# Patient Record
Sex: MALE | Race: WHITE | NOT HISPANIC OR LATINO | Employment: FULL TIME | URBAN - METROPOLITAN AREA
[De-identification: names, ages, dates, MRNs, and addresses within clinical notes are randomized per-mention and may not be internally consistent; named-entity substitution may affect disease eponyms.]

---

## 2017-10-07 ENCOUNTER — ALLIED HEALTH/NURSE VISIT (OUTPATIENT)
Dept: NURSING | Facility: CLINIC | Age: 32
End: 2017-10-07
Payer: COMMERCIAL

## 2017-10-07 DIAGNOSIS — Z23 NEED FOR PROPHYLACTIC VACCINATION AND INOCULATION AGAINST INFLUENZA: Primary | ICD-10-CM

## 2017-10-07 PROCEDURE — 90686 IIV4 VACC NO PRSV 0.5 ML IM: CPT

## 2017-10-07 PROCEDURE — 99207 ZZC NO CHARGE NURSE ONLY: CPT

## 2017-10-07 PROCEDURE — 90471 IMMUNIZATION ADMIN: CPT

## 2017-10-07 NOTE — PROGRESS NOTES
Injectable Influenza Immunization Documentation    1.  Is the person to be vaccinated sick today?   No    2. Does the person to be vaccinated have an allergy to a component   of the vaccine?   No    3. Has the person to be vaccinated ever had a serious reaction   to influenza vaccine in the past?   No    4. Has the person to be vaccinated ever had Guillain-Barré syndrome?   No    Form completed by Florinda Arrington/[facility]

## 2017-10-07 NOTE — MR AVS SNAPSHOT
"              After Visit Summary   10/7/2017    Ivan Goode    MRN: [id number]           Patient Information     Date Of Birth          1985        Visit Information        Provider Department      10/7/2017 9:30 AM PRAFUL EUGENE MA/[facility]        Today's Diagnoses     Need for prophylactic vaccination and inoculation against influenza    -  1       Follow-ups after your visit        Who to contact     If you have questions or need follow up information about today's clinic visit or your schedule please contact [facility] directly at [phone].  Normal or non-critical lab and imaging results will be communicated to you by InTuun Systemshart, letter or phone within 4 business days after the clinic has received the results. If you do not hear from us within 7 days, please contact the clinic through Newzstandt or phone. If you have a critical or abnormal lab result, we will notify you by phone as soon as possible.  Submit refill requests through Allclasses or call your pharmacy and they will forward the refill request to us. Please allow 3 business days for your refill to be completed.          Additional Information About Your Visit        MyChart Information     Allclasses lets you send messages to your doctor, view your test results, renew your prescriptions, schedule appointments and more. To sign up, go to [email or web address] . Click on \"Log in\" on the left side of the screen, which will take you to the Welcome page. Then click on \"Sign up Now\" on the right side of the page.     You will be asked to enter the access code listed below, as well as some personal information. Please follow the directions to create your username and password.     Your access code is: [email or web address]  Expires: 2018 10:35 AM     Your access code will  in 90 days. If you need help or a new code, please call your [facility] or [phone].        Care EveryWhere ID     This is your " Care EveryWhere ID. This could be used by other organizations to access your [place] medical records  [id number]         Blood Pressure from Last 3 Encounters:   04/05/16 126/74   03/21/16 130/70   12/17/15 112/60    Weight from Last 3 Encounters:   04/05/16 211 lb (95.7 kg)   03/21/16 209 lb (94.8 kg)   12/17/15 206 lb (93.4 kg)              We Performed the Following     FLU VAC, SPLIT VIRUS IM > 3 YO (QUADRIVALENT) [66254]     Vaccine Administration, Initial [48634]        Primary Care Provider Office Phone # Fax #    Tra Stefano Crocker PA-C [phone]       [address] [facility] [address]        Equal Access to Services     RUSTY BENITO : Hadii rahul ortega Sojuan pablo, waaxda luqadaha, qaybta kaalmada adeegyaaraseli, gokul house . So [facility] [phone].    ATENCIÓN: Si habla español, tiene a mccullough disposición servicios gratuitos de asistencia lingüística. Llame al [phone].    We comply with applicable federal civil rights laws and [place] laws. We do not discriminate on the basis of race, color, national origin, age, disability, sex, sexual orientation, or gender identity.            Thank you!     Thank you for choosing [facility]  for your care. Our goal is always to provide you with excellent care. Hearing back from our patients is one way we can continue to improve our services. Please take a few minutes to complete the written survey that you may receive in the mail after your visit with us. Thank you!             Your Updated Medication List - Protect others around you: Learn how to safely use, store and throw away your medicines at www.disposemymeds.org.      Notice  As of 10/7/2017 10:35 AM    You have not been prescribed any medications.

## 2018-03-13 ENCOUNTER — OFFICE VISIT (OUTPATIENT)
Dept: OPTOMETRY | Facility: CLINIC | Age: 33
End: 2018-03-13
Payer: COMMERCIAL

## 2018-03-13 ENCOUNTER — TELEPHONE (OUTPATIENT)
Dept: OPTOMETRY | Facility: CLINIC | Age: 33
End: 2018-03-13

## 2018-03-13 DIAGNOSIS — H52.13 MYOPIA, BILATERAL: Primary | ICD-10-CM

## 2018-03-13 PROCEDURE — 92015 DETERMINE REFRACTIVE STATE: CPT | Performed by: OPTOMETRIST

## 2018-03-13 PROCEDURE — 92004 COMPRE OPH EXAM NEW PT 1/>: CPT | Performed by: OPTOMETRIST

## 2018-03-13 PROCEDURE — 92310 CONTACT LENS FITTING OU: CPT | Performed by: OPTOMETRIST

## 2018-03-13 ASSESSMENT — REFRACTION_WEARINGRX
OD_CYLINDER: SPHERE
OS_AXIS: 070
OD_SPHERE: -5.75
OS_SPHERE: -5.75
SPECS_TYPE: SVL
OS_CYLINDER: +0.25

## 2018-03-13 ASSESSMENT — KERATOMETRY
OD_K1POWER_DIOPTERS: 44.00
OS_AXISANGLE_DEGREES: 37
OD_AXISANGLE2_DEGREES: 38
METHOD_AUTO_MANUAL: MANUAL
OS_K2POWER_DIOPTERS: 45.25
OD_K2POWER_DIOPTERS: 43.50
OS_AXISANGLE2_DEGREES: 127
OD_AXISANGLE_DEGREES: 128
OS_K1POWER_DIOPTERS: 45.12

## 2018-03-13 ASSESSMENT — REFRACTION_CURRENTRX
OD_SPHERE: -4.50
OD_BRAND: BOSTON EO
OS_BRAND: BOSTON EO
OS_DIAMETER: 9.2
OS_SPHERE: -4.75
OS_BASECURVE: 44.50
OD_DIAMETER: 9.2
OD_BASECURVE: 44.00

## 2018-03-13 ASSESSMENT — VISUAL ACUITY
OD_SC: 20/500
OS_CC: 20/30
METHOD: SNELLEN - LINEAR
OS_CC: 20/30
OD_CC: 20/30
CORRECTION_TYPE: GLASSES
OD_CC+: -2
OD_CC: 20/30
OS_CC+: -1
OS_SC: 20/300

## 2018-03-13 ASSESSMENT — SLIT LAMP EXAM - LIDS
COMMENTS: NORMAL
COMMENTS: NORMAL

## 2018-03-13 ASSESSMENT — CUP TO DISC RATIO
OD_RATIO: 0.15
OS_RATIO: 0.15

## 2018-03-13 ASSESSMENT — REFRACTION_MANIFEST
OS_SPHERE: -6.25
OD_AXIS: 150
METHOD_AUTOREFRACTION: 1
OD_CYLINDER: +0.75
OS_CYLINDER: +1.00
OS_SPHERE: -6.50
OD_SPHERE: -5.50
OD_CYLINDER: +0.75
OS_CYLINDER: +0.75
OS_AXIS: 025
OD_AXIS: 160
OS_AXIS: 020
OD_SPHERE: -5.50

## 2018-03-13 ASSESSMENT — TONOMETRY
OD_IOP_MMHG: 16
OS_IOP_MMHG: 16
IOP_METHOD: APPLANATION

## 2018-03-13 ASSESSMENT — CONF VISUAL FIELD
OS_NORMAL: 1
OD_NORMAL: 1

## 2018-03-13 ASSESSMENT — EXTERNAL EXAM - RIGHT EYE: OD_EXAM: NORMAL

## 2018-03-13 ASSESSMENT — EXTERNAL EXAM - LEFT EYE: OS_EXAM: NORMAL

## 2018-03-13 NOTE — PROGRESS NOTES
Chief Complaint   Patient presents with     COMPREHENSIVE EYE EXAM     Gas Perm Contact     Contact Lens Evaluation     Had a lasik consult last year and discontinue wear for 3 mos he was seen at [place] for another PRK and was not a candidate   Some inferior thinning   Vision was stable     Previous contact lens wearer? Yes: Gas Perm  Comfort of contact lenses :Good  Satisfied with current lenses: Yes, Open to other options for lenses        Last Eye Exam: 10yrs  Dilated Previously: Yes    What are you currently using to see?  contacts    Distance Vision Acuity: Satisfied with vision    Near Vision Acuity: Satisfied with vision while reading  unaided    Eye Comfort: good  Do you use eye drops? : No  Occupation or Hobbies: Everyday stuff  Works at [employer]         Medical, surgical and family histories reviewed and updated 3/13/2018.       OBJECTIVE: See Ophthalmology exam    ASSESSMENT:    ICD-10-CM    1. Myopia, bilateral H52.13 EYE EXAM (SIMPLE-NONBILLABLE)     REFRACTION     CONTACT LENS FITTING,BILAT          PLAN:   Replace glasses and contacts   Ordered gas perms in [facility] as indicated   Return to clinic for dispense    Siri Dial OD

## 2018-03-13 NOTE — TELEPHONE ENCOUNTER
PLAN:   Replace glasses and contacts   Ordered gas perms in [place] [facility] as indicated   Return to clinic for dispense     Siri Dial OD    Current Contact Lens Rx (Ordered)     Brand Base Curve Diameter Sphere Lens Addl. Specs   Right [place] [facility] 44.00 9.2 -4.50 dot blue lenticular, warranted    Left [place] [facility] 44.50 9.2 -4.75 dot         Addy Rose  Optometric Technician

## 2018-03-13 NOTE — MR AVS SNAPSHOT
"              After Visit Summary   3/13/2018    Ivan Goode    MRN: [id number]           Patient Information     Date Of Birth          1985        Visit Information        Provider Department      3/13/2018 2:00 PM Siri Dial OD [facility]        Today's Diagnoses     Myopia, bilateral    -  1       Follow-ups after your visit        Follow-up notes from your care team     Return in about 1 year (around 3/13/2019).      Who to contact     If you have questions or need follow up information about today's clinic visit or your schedule please contact [facility] directly at [phone].  Normal or non-critical lab and imaging results will be communicated to you by MyChart, letter or phone within 4 business days after the clinic has received the results. If you do not hear from us within 7 days, please contact the clinic through [email or web address] or phone. If you have a critical or abnormal lab result, we will notify you by phone as soon as possible.  Submit refill requests through InspireMD or call your pharmacy and they will forward the refill request to us. Please allow 3 business days for your refill to be completed.          Additional Information About Your Visit        MyChart Information     InspireMD lets you send messages to your doctor, view your test results, renew your prescriptions, schedule appointments and more. To sign up, go to [email or web address] . Click on \"Log in\" on the left side of the screen, which will take you to the Welcome page. Then click on \"Sign up Now\" on the right side of the page.     You will be asked to enter the access code listed below, as well as some personal information. Please follow the directions to create your username and password.     Your access code is: [email or web address]  Expires: 2018  3:04 PM     Your access code will  in 90 days. If you need help or a new code, please call your [place] clinic or [phone].      "   Care EveryWhere ID     This is your Care EveryWhere ID. This could be used by other organizations to access your [place] medical records  [id number]         Blood Pressure from Last 3 Encounters:   04/05/16 126/74   03/21/16 130/70   12/17/15 112/60    Weight from Last 3 Encounters:   04/05/16 95.7 kg (211 lb)   03/21/16 94.8 kg (209 lb)   12/17/15 93.4 kg (206 lb)              We Performed the Following     CONTACT LENS FITTING,BILAT     EYE EXAM (SIMPLE-NONBILLABLE)     REFRACTION        Primary Care Provider Office Phone # Fax #    Tra Stefano Crocker PA-C [phone] [address] [facility] [address]        Equal Access to Services     RUSTY BENITO : Hadii rahul juareso Sojuan pablo, waaxda luqadaha, qaybta kaalmada adeegyada, gokul drake. So [facility] [phone].    ATENCIÓN: Si habla español, tiene a mccullough disposición servicios gratuitos de asistencia lingüística. Llame al [phone].    We comply with applicable federal civil rights laws and [place] laws. We do not discriminate on the basis of race, color, national origin, age, disability, sex, sexual orientation, or gender identity.            Thank you!     Thank you for choosing [facility]  for your care. Our goal is always to provide you with excellent care. Hearing back from our patients is one way we can continue to improve our services. Please take a few minutes to complete the written survey that you may receive in the mail after your visit with us. Thank you!             Your Updated Medication List - Protect others around you: Learn how to safely use, store and throw away your medicines at www.disposemymeds.org.      Notice  As of 3/13/2018  3:04 PM    You have not been prescribed any medications.

## 2018-03-13 NOTE — LETTER
3/13/2018         RE: Ivan Goode  [address]  [place] [address]        Dear Colleague,    Thank you for referring your patient, Ivan Goode, to the [facility]. Please see a copy of my visit note below.    Chief Complaint   Patient presents with     COMPREHENSIVE EYE EXAM     Gas Perm Contact     Contact Lens Evaluation     Had a lasik consult last year and discontinue wear for 3 mos he was seen at [place] for another PRK and was not a candidate   Some inferior thinning   Vision was stable     Previous contact lens wearer? Yes: Gas Perm  Comfort of contact lenses :Good  Satisfied with current lenses: Yes, Open to other options for lenses        Last Eye Exam: 10yrs  Dilated Previously: Yes    What are you currently using to see?  contacts    Distance Vision Acuity: Satisfied with vision    Near Vision Acuity: Satisfied with vision while reading  unaided    Eye Comfort: good  Do you use eye drops? : No  Occupation or Hobbies: Everyday stuff  Works at [employer]         Medical, surgical and family histories reviewed and updated 3/13/2018.       OBJECTIVE: See Ophthalmology exam    ASSESSMENT:    ICD-10-CM    1. Myopia, bilateral H52.13 EYE EXAM (SIMPLE-NONBILLABLE)     REFRACTION     CONTACT LENS FITTING,BILAT          PLAN:   Replace glasses and contacts   Ordered gas perms in [facility] as indicated   Return to clinic for dispense    Siri Dial OD                     Again, thank you for allowing me to participate in the care of your patient.        Sincerely,        Siri Dial, OD

## 2018-03-20 ENCOUNTER — OFFICE VISIT (OUTPATIENT)
Dept: OPTOMETRY | Facility: CLINIC | Age: 33
End: 2018-03-20
Payer: COMMERCIAL

## 2018-03-20 DIAGNOSIS — H52.13 MYOPIA, BILATERAL: Primary | ICD-10-CM

## 2018-03-20 PROCEDURE — 99207 ZZC NO BILLABLE SERVICE THIS VISIT: CPT | Performed by: OPTOMETRIST

## 2018-03-20 PROCEDURE — 92499 UNLISTED OPH SVC/PROCEDURE: CPT | Performed by: OPTOMETRIST

## 2018-03-20 ASSESSMENT — REFRACTION_CURRENTRX
OD_BASECURVE: 44.00
OS_SPHERE: -4.75
OD_DIAMETER: 9.2
OS_BRAND: BOSTON EO
OD_SPHERE: -4.50
OD_BASECURVE: 44.50
OD_BRAND: BOSTON EO
OS_DIAMETER: 9.2
OS_BRAND: BOSTON EO
OD_DIAMETER: 9.2
OS_BASECURVE: 44.50
OS_SPHERE: -5.25
OD_SPHERE: -5.00
OD_BRAND: BOSTON EO
OS_BASECURVE: 45.00
OS_DIAMETER: 9.2

## 2018-03-20 ASSESSMENT — VISUAL ACUITY
METHOD: SNELLEN - LINEAR
CORRECTION_TYPE: CONTACTS

## 2018-03-20 ASSESSMENT — EXTERNAL EXAM - LEFT EYE: OS_EXAM: NORMAL

## 2018-03-20 ASSESSMENT — SLIT LAMP EXAM - LIDS
COMMENTS: FOAMY TEARS
COMMENTS: FOAMY TEARS

## 2018-03-20 ASSESSMENT — EXTERNAL EXAM - RIGHT EYE: OD_EXAM: NORMAL

## 2018-03-20 NOTE — Clinical Note
Order another pair under warranty 0.5D steeper with adjusted ocampo. Return to clinic for dispense. thx

## 2018-03-20 NOTE — MR AVS SNAPSHOT
"              After Visit Summary   3/20/2018    Ivan Goode    MRN: [id number]           Patient Information     Date Of Birth          1985        Visit Information        Provider Department      3/20/2018 9:20 AM Siri Dial OD [facility]        Today's Diagnoses     Myopia, bilateral    -  1       Follow-ups after your visit        Who to contact     If you have questions or need follow up information about today's clinic visit or your schedule please contact [facility] directly at [phone].  Normal or non-critical lab and imaging results will be communicated to you by MyChart, letter or phone within 4 business days after the clinic has received the results. If you do not hear from us within 7 days, please contact the clinic through [place] 1000CHI Software Technologyhart or phone. If you have a critical or abnormal lab result, we will notify you by phone as soon as possible.  Submit refill requests through Fandeavor or call your pharmacy and they will forward the refill request to us. Please allow 3 business days for your refill to be completed.          Additional Information About Your Visit        MyChart Information     Fandeavor lets you send messages to your doctor, view your test results, renew your prescriptions, schedule appointments and more. To sign up, go to [email or web address] . Click on \"Log in\" on the left side of the screen, which will take you to the Welcome page. Then click on \"Sign up Now\" on the right side of the page.     You will be asked to enter the access code listed below, as well as some personal information. Please follow the directions to create your username and password.     Your access code is: [email or web address]  Expires: 2018  3:04 PM     Your access code will  in 90 days. If you need help or a new code, please call your [facility] or [phone].        Care EveryWhere ID     This is your Care EveryWhere ID. This could be used by other " organizations to access your [place] medical records  [id number]         Blood Pressure from Last 3 Encounters:   04/05/16 126/74   03/21/16 130/70   12/17/15 112/60    Weight from Last 3 Encounters:   04/05/16 95.7 kg (211 lb)   03/21/16 94.8 kg (209 lb)   12/17/15 93.4 kg (206 lb)              Today, you had the following     No orders found for display       Primary Care Provider Office Phone # Fax #    Tra Stefano Crocker PA-C [phone] [address] [address] [facility] [address]        Equal Access to Services     SINDHU BENITO : Hadii rahul Esposito, waaxda luguanakoadaha, qaybta kaalmada schuyler, gokul house . So [facility] [phone].    ATENCIÓN: Si habla español, tiene a mccullough disposición servicios gratuitos de asistencia lingüística. Llame al [phone].    We comply with applicable federal civil rights laws and [place] laws. We do not discriminate on the basis of race, color, national origin, age, disability, sex, sexual orientation, or gender identity.            Thank you!     Thank you for choosing [facility]  for your care. Our goal is always to provide you with excellent care. Hearing back from our patients is one way we can continue to improve our services. Please take a few minutes to complete the written survey that you may receive in the mail after your visit with us. Thank you!             Your Updated Medication List - Protect others around you: Learn how to safely use, store and throw away your medicines at www.disposemymeds.org.      Notice  As of 3/20/2018  1:02 PM    You have not been prescribed any medications.

## 2018-03-20 NOTE — LETTER
3/20/2018         RE: vIan Goode  [address]  [place] [address]        Dear Colleague,    Thank you for referring your patient, Ivan Goode, to the [facility]. Please see a copy of my visit note below.    Chief Complaint   Patient presents with     Contact Lens Check     Dispense and check     [place] EO feel good  Contact lenses dispensed          OBJECTIVE: See Ophthalmology exam     ASSESSMENT:    ICD-10-CM    1. Myopia, bilateral H52.13     lenses are too flat  PLAN:  Warm compresses lid hygiene, ARTIFICIAL TEARS  OD  Dispensed   Order 0.5D steeper with minus adjustment  Exchange at dispense    Siri Dial OD           Again, thank you for allowing me to participate in the care of your patient.        Sincerely,        Siri Dial, OD

## 2018-03-20 NOTE — PROGRESS NOTES
Chief Complaint   Patient presents with     Contact Lens Check     Dispense and check     [place] EO feel good  Contact lenses dispensed          OBJECTIVE: See Ophthalmology exam     ASSESSMENT:    ICD-10-CM    1. Myopia, bilateral H52.13     lenses are too flat  PLAN:  Warm compresses lid hygiene, ARTIFICIAL TEARS  OD  Dispensed   Order 0.5D steeper with minus adjustment  Exchange at dispense    Siri Dial OD

## 2018-07-17 ENCOUNTER — OFFICE VISIT (OUTPATIENT)
Dept: OPTOMETRY | Facility: CLINIC | Age: 33
End: 2018-07-17
Payer: COMMERCIAL

## 2018-07-17 DIAGNOSIS — H52.13 MYOPIA OF BOTH EYES: Primary | ICD-10-CM

## 2018-07-17 PROCEDURE — 99207 ZZC NO BILLABLE SERVICE THIS VISIT: CPT | Performed by: OPTOMETRIST

## 2018-07-17 ASSESSMENT — REFRACTION_CURRENTRX
OD_BASECURVE: 44.50
OD_DIAMETER: 9.2
OS_BASECURVE: 45.00
OD_BRAND: BOSTON EO
OS_SPHERE: -5.25
OD_SPHERE: -5.00
OS_DIAMETER: 9.2
OS_BRAND: BOSTON EO

## 2018-07-17 NOTE — MR AVS SNAPSHOT
After Visit Summary   7/17/2018    Ivan Goode    MRN: [id number]           Patient Information     Date Of Birth          1985        Visit Information        Provider Department      7/17/2018 10:20 AM Siri Dial OD [facility]        Today's Diagnoses     Myopia of both eyes    -  1       Follow-ups after your visit        Follow-up notes from your care team     Return if symptoms worsen or fail to improve.      Who to contact     If you have questions or need follow up information about today's clinic visit or your schedule please contact [facility] directly at [phone].  Normal or non-critical lab and imaging results will be communicated to you by MyChart, letter or phone within 4 business days after the clinic has received the results. If you do not hear from us within 7 days, please contact the clinic through MyChart or phone. If you have a critical or abnormal lab result, we will notify you by phone as soon as possible.  Submit refill requests through adjust or call your pharmacy and they will forward the refill request to us. Please allow 3 business days for your refill to be completed.          Additional Information About Your Visit        Care EveryWhere ID     This is your Care EveryWhere ID. This could be used by other organizations to access your [place] medical records  [id number]         Blood Pressure from Last 3 Encounters:   04/05/16 126/74   03/21/16 130/70   12/17/15 112/60    Weight from Last 3 Encounters:   04/05/16 95.7 kg (211 lb)   03/21/16 94.8 kg (209 lb)   12/17/15 93.4 kg (206 lb)              Today, you had the following     No orders found for display       Primary Care Provider Office Phone # Fax #    Tra Stefano Crocker PA-C [phone] [address] [facility] [address]        Equal Access to Services     RUSTY BENITO : Ellyn Esposito, chris cavanaugh, genaro hewitt  gokul payanshawnglory la'aasina ah. [facility] [phone].    ATENCIÓN: Si habla español, tiene a mccullough disposición servicios gratuitos de asistencia lingüística. Arminda al [phone].    We comply with applicable federal civil rights laws and [place] laws. We do not discriminate on the basis of race, color, national origin, age, disability, sex, sexual orientation, or gender identity.            Thank you!     Thank you for choosing [facility]  for your care. Our goal is always to provide you with excellent care. Hearing back from our patients is one way we can continue to improve our services. Please take a few minutes to complete the written survey that you may receive in the mail after your visit with us. Thank you!             Your Updated Medication List - Protect others around you: Learn how to safely use, store and throw away your medicines at www.disposemymeds.org.      Notice  As of 7/17/2018 12:02 PM    You have not been prescribed any medications.

## 2018-07-17 NOTE — PROGRESS NOTES
Contact lens follow up  With complaints of/ no complaints, needs contact lens  Prescription finalized   Change in fit was picked up and never return to clinic for follow up   Good comfort, vision, no problems       Assessment:  Good fit and vision and comfort with the above contact lenses  Lenses wet well    Plan:   A contact lens  prescription was released     Siri Dial OD

## 2018-07-17 NOTE — LETTER
7/17/2018         RE: Ivan Goode  [address]  [place] [address]        Dear Colleague,    Thank you for referring your patient, Ivan Goode, to the [facility]. Please see a copy of my visit note below.    Contact lens follow up  With complaints of/ no complaints, needs contact lens  Prescription finalized   Change in fit was picked up and never return to clinic for follow up   Good comfort, vision, no problems       Assessment:  Good fit and vision and comfort with the above contact lenses  Lenses wet well    Plan:   A contact lens  prescription was released     Siri Dial OD     Again, thank you for allowing me to participate in the care of your patient.        Sincerely,        Siri Dial, OD

## 2018-10-11 ENCOUNTER — ALLIED HEALTH/NURSE VISIT (OUTPATIENT)
Dept: NURSING | Facility: CLINIC | Age: 33
End: 2018-10-11
Payer: COMMERCIAL

## 2018-10-11 DIAGNOSIS — Z23 NEED FOR PROPHYLACTIC VACCINATION AND INOCULATION AGAINST INFLUENZA: Primary | ICD-10-CM

## 2018-10-11 PROCEDURE — 90471 IMMUNIZATION ADMIN: CPT

## 2018-10-11 PROCEDURE — 90686 IIV4 VACC NO PRSV 0.5 ML IM: CPT

## 2018-10-11 NOTE — PROGRESS NOTES

## 2018-10-11 NOTE — MR AVS SNAPSHOT
After Visit Summary   10/11/2018    Ivan Goode    MRN: [id number]           Patient Information     Date Of Birth          1985        Visit Information        Provider Department      10/11/2018 3:00 PM PRAFUL LEMUS/[facility]        Today's Diagnoses     Need for prophylactic vaccination and inoculation against influenza    -  1       Follow-ups after your visit        Who to contact     If you have questions or need follow up information about today's clinic visit or your schedule please contact [facility] directly at [phone].  Normal or non-critical lab and imaging results will be communicated to you by MyChart, letter or phone within 4 business days after the clinic has received the results. If you do not hear from us within 7 days, please contact the clinic through MyChart or phone. If you have a critical or abnormal lab result, we will notify you by phone as soon as possible.  Submit refill requests through NetScaler or call your pharmacy and they will forward the refill request to us. Please allow 3 business days for your refill to be completed.          Additional Information About Your Visit        Care EveryWhere ID     This is your Care EveryWhere ID. This could be used by other organizations to access your [place] medical records  [id number]         Blood Pressure from Last 3 Encounters:   04/05/16 126/74   03/21/16 130/70   12/17/15 112/60    Weight from Last 3 Encounters:   04/05/16 211 lb (95.7 kg)   03/21/16 209 lb (94.8 kg)   12/17/15 206 lb (93.4 kg)              We Performed the Following     FLU VACCINE, SPLIT VIRUS, IM (QUADRIVALENT) [97808]- >3 YRS     Vaccine Administration, Initial [15883]        Primary Care Provider Office Phone # Fax #    Tra Stefano Crocker PA-C [phone] [address] [facility] [address]        Equal Access to Services     RUSTY BENITO : Ellyn Esposito,  chris cavanaugh, kiaraenio restrepoclay pasqualehowie, gokul naziain hayaan fredymelvin redjustyna laYonnytc ah. So [facility] [phone].    ATENCIÓN: Si habla español, tiene a mccullough disposición servicios gratuitos de asistencia lingüística. Llame al [phone].    We comply with applicable federal civil rights laws and [place] laws. We do not discriminate on the basis of race, color, national origin, age, disability, sex, sexual orientation, or gender identity.            Thank you!     Thank you for choosing [facility]  for your care. Our goal is always to provide you with excellent care. Hearing back from our patients is one way we can continue to improve our services. Please take a few minutes to complete the written survey that you may receive in the mail after your visit with us. Thank you!             Your Updated Medication List - Protect others around you: Learn how to safely use, store and throw away your medicines at www.disposemymeds.org.      Notice  As of 10/11/2018  3:42 PM    You have not been prescribed any medications.

## 2019-10-01 ENCOUNTER — ALLIED HEALTH/NURSE VISIT (OUTPATIENT)
Dept: NURSING | Facility: CLINIC | Age: 34
End: 2019-10-01
Payer: COMMERCIAL

## 2019-10-01 DIAGNOSIS — Z23 NEED FOR PROPHYLACTIC VACCINATION AND INOCULATION AGAINST INFLUENZA: Primary | ICD-10-CM

## 2019-10-01 PROCEDURE — 90686 IIV4 VACC NO PRSV 0.5 ML IM: CPT

## 2019-10-01 PROCEDURE — 90471 IMMUNIZATION ADMIN: CPT

## 2019-10-01 PROCEDURE — 99207 ZZC NO CHARGE NURSE ONLY: CPT

## 2020-10-08 ENCOUNTER — ALLIED HEALTH/NURSE VISIT (OUTPATIENT)
Dept: FAMILY MEDICINE | Facility: CLINIC | Age: 35
End: 2020-10-08
Payer: COMMERCIAL

## 2020-10-08 DIAGNOSIS — Z23 NEED FOR PROPHYLACTIC VACCINATION AND INOCULATION AGAINST INFLUENZA: Primary | ICD-10-CM

## 2020-10-08 PROCEDURE — 90471 IMMUNIZATION ADMIN: CPT

## 2020-10-08 PROCEDURE — 99207 PR NO CHARGE NURSE ONLY: CPT

## 2020-10-08 PROCEDURE — 90686 IIV4 VACC NO PRSV 0.5 ML IM: CPT

## 2021-04-07 ENCOUNTER — IMMUNIZATION (OUTPATIENT)
Dept: NURSING | Facility: CLINIC | Age: 36
End: 2021-04-07
Payer: COMMERCIAL

## 2021-04-07 PROCEDURE — 91300 PR COVID VAC PFIZER DIL RECON 30 MCG/0.3 ML IM: CPT

## 2021-04-07 PROCEDURE — 0001A PR COVID VAC PFIZER DIL RECON 30 MCG/0.3 ML IM: CPT

## 2021-04-28 ENCOUNTER — IMMUNIZATION (OUTPATIENT)
Dept: NURSING | Facility: CLINIC | Age: 36
End: 2021-04-28
Attending: INTERNAL MEDICINE
Payer: COMMERCIAL

## 2021-04-28 PROCEDURE — 91300 PR COVID VAC PFIZER DIL RECON 30 MCG/0.3 ML IM: CPT

## 2021-04-28 PROCEDURE — 0002A PR COVID VAC PFIZER DIL RECON 30 MCG/0.3 ML IM: CPT

## 2021-10-02 ENCOUNTER — IMMUNIZATION (OUTPATIENT)
Dept: FAMILY MEDICINE | Facility: CLINIC | Age: 36
End: 2021-10-02
Payer: COMMERCIAL

## 2021-10-02 PROCEDURE — 90471 IMMUNIZATION ADMIN: CPT

## 2021-10-02 PROCEDURE — 90686 IIV4 VACC NO PRSV 0.5 ML IM: CPT

## 2021-11-09 ENCOUNTER — ALLIED HEALTH/NURSE VISIT (OUTPATIENT)
Dept: FAMILY MEDICINE | Facility: CLINIC | Age: 36
End: 2021-11-09
Payer: COMMERCIAL

## 2021-11-09 DIAGNOSIS — Z23 NEED FOR COVID-19 VACCINE: Primary | ICD-10-CM

## 2021-11-09 PROCEDURE — 91300 PR COVID VAC PFIZER DIL RECON 30 MCG/0.3 ML IM: CPT

## 2021-11-09 PROCEDURE — 0004A PR COVID VAC PFIZER DIL RECON 30 MCG/0.3 ML IM: CPT

## 2021-11-09 PROCEDURE — 99207 PR NO CHARGE NURSE ONLY: CPT

## 2021-11-21 ENCOUNTER — HEALTH MAINTENANCE LETTER (OUTPATIENT)
Age: 36
End: 2021-11-21

## 2021-11-30 ENCOUNTER — OFFICE VISIT (OUTPATIENT)
Dept: FAMILY MEDICINE | Facility: CLINIC | Age: 36
End: 2021-11-30
Payer: COMMERCIAL

## 2021-11-30 VITALS
HEIGHT: 70 IN | SYSTOLIC BLOOD PRESSURE: 128 MMHG | BODY MASS INDEX: 34.07 KG/M2 | WEIGHT: 238 LBS | TEMPERATURE: 98.1 F | DIASTOLIC BLOOD PRESSURE: 72 MMHG | HEART RATE: 66 BPM | OXYGEN SATURATION: 97 %

## 2021-11-30 DIAGNOSIS — G56.03 BILATERAL CARPAL TUNNEL SYNDROME: ICD-10-CM

## 2021-11-30 DIAGNOSIS — Z00.00 ROUTINE GENERAL MEDICAL EXAMINATION AT A HEALTH CARE FACILITY: Primary | ICD-10-CM

## 2021-11-30 DIAGNOSIS — L30.9 DERMATITIS: ICD-10-CM

## 2021-11-30 DIAGNOSIS — Z13.220 SCREENING FOR HYPERLIPIDEMIA: ICD-10-CM

## 2021-11-30 DIAGNOSIS — Z11.59 NEED FOR HEPATITIS C SCREENING TEST: ICD-10-CM

## 2021-11-30 LAB — HCV AB SERPL QL IA: NONREACTIVE

## 2021-11-30 PROCEDURE — 36415 COLL VENOUS BLD VENIPUNCTURE: CPT | Performed by: PHYSICIAN ASSISTANT

## 2021-11-30 PROCEDURE — 86803 HEPATITIS C AB TEST: CPT | Performed by: PHYSICIAN ASSISTANT

## 2021-11-30 PROCEDURE — 99213 OFFICE O/P EST LOW 20 MIN: CPT | Mod: 25 | Performed by: PHYSICIAN ASSISTANT

## 2021-11-30 PROCEDURE — 80061 LIPID PANEL: CPT | Performed by: PHYSICIAN ASSISTANT

## 2021-11-30 PROCEDURE — 99395 PREV VISIT EST AGE 18-39: CPT | Performed by: PHYSICIAN ASSISTANT

## 2021-11-30 PROCEDURE — 82947 ASSAY GLUCOSE BLOOD QUANT: CPT | Performed by: PHYSICIAN ASSISTANT

## 2021-11-30 RX ORDER — BENZOCAINE/MENTHOL 6 MG-10 MG
LOZENGE MUCOUS MEMBRANE 2 TIMES DAILY
Qty: 45 G | Refills: 0 | Status: SHIPPED | OUTPATIENT
Start: 2021-11-30

## 2021-11-30 ASSESSMENT — ENCOUNTER SYMPTOMS
HEADACHES: 0
MYALGIAS: 0
NERVOUS/ANXIOUS: 0
CHILLS: 0
ABDOMINAL PAIN: 0
NAUSEA: 0
DIARRHEA: 0
DIZZINESS: 0
COUGH: 0
FEVER: 0
CONSTIPATION: 0
JOINT SWELLING: 0
DYSURIA: 0
HEARTBURN: 0
SORE THROAT: 0
SHORTNESS OF BREATH: 0
PARESTHESIAS: 0
ARTHRALGIAS: 0
WEAKNESS: 0
EYE PAIN: 0
PALPITATIONS: 0
HEMATOCHEZIA: 0
FREQUENCY: 0
HEMATURIA: 0

## 2021-11-30 ASSESSMENT — MIFFLIN-ST. JEOR: SCORE: 2015.81

## 2021-11-30 NOTE — PROGRESS NOTES
SUBJECTIVE:   CC: Ivan Goode is an 36 year old male who presents for preventative health visit.       Patient has been advised of split billing requirements and indicates understanding: Yes  Healthy Habits:     Getting at least 3 servings of Calcium per day:  Yes    Bi-annual eye exam:  Yes    Dental care twice a year:  Yes    Sleep apnea or symptoms of sleep apnea:  None    Diet:  Regular (no restrictions)    Frequency of exercise:  None    Taking medications regularly:  Yes    Medication side effects:  Not applicable    PHQ-2 Total Score: 0    Additional concerns today:  No        Joint Pain    Onset: months but worsened over last month.     Description:   Location: bilateral wrists  Character: no pain    Intensity: mild    Progression of Symptoms: worse    Accompanying Signs & Symptoms:  Other symptoms: numbness and tingling    History:   Previous similar pain: no       Precipitating factors:   Trauma or overuse: YES- uses both hands for multiple tasks. Worse at night.      Alleviating factors:  Improved by: nothing    Therapies Tried and outcome: none       Rash  Onset: 1 month     Description:   Location: scrotum   Character: raised, red  Itching (Pruritis): YES    Progression of Symptoms:  same    Accompanying Signs & Symptoms:  Fever: no   Body aches or joint pain: no   Sore throat symptoms: no   Recent cold symptoms: no     History:   Previous similar rash: no     Precipitating factors:   Exposure to similar rash: no   New exposures: None   Recent travel: no     Alleviating factors:  None     Therapies Tried and outcome: lotrimin and lamisil otc. No improvement.     Today's PHQ-2 Score:   PHQ-2 ( 1999 Pfizer) 11/30/2021   Q1: Little interest or pleasure in doing things 0   Q2: Feeling down, depressed or hopeless 0   PHQ-2 Score 0   Q1: Little interest or pleasure in doing things Not at all   Q2: Feeling down, depressed or hopeless Not at all   PHQ-2 Score 0       Abuse: Current or Past(Physical,  Sexual or Emotional)- No  Do you feel safe in your environment? Yes    Have you ever done Advance Care Planning? (For example, a Health Directive, POLST, or a discussion with a medical provider or your loved ones about your wishes): No, advance care planning information given to patient to review.  Patient declined advance care planning discussion at this time.    Social History     Tobacco Use     Smoking status: Never Smoker     Smokeless tobacco: Never Used   Substance Use Topics     Alcohol use: Yes     Alcohol/week: 0.0 standard drinks     Comment: ocass     If you drink alcohol do you typically have >3 drinks per day or >7 drinks per week? No    Alcohol Use 11/30/2021   Prescreen: >3 drinks/day or >7 drinks/week? No   Prescreen: >3 drinks/day or >7 drinks/week? -       Last PSA: No results found for: PSA    Reviewed orders with patient. Reviewed health maintenance and updated orders accordingly - Yes  BP Readings from Last 3 Encounters:   11/30/21 128/72   04/05/16 126/74   03/21/16 130/70    Wt Readings from Last 3 Encounters:   11/30/21 108 kg (238 lb)   04/05/16 95.7 kg (211 lb)   03/21/16 94.8 kg (209 lb)                  Patient Active Problem List   Diagnosis     CARDIOVASCULAR SCREENING; LDL GOAL LESS THAN 160     History reviewed. No pertinent surgical history.    Social History     Tobacco Use     Smoking status: Never Smoker     Smokeless tobacco: Never Used   Substance Use Topics     Alcohol use: Yes     Alcohol/week: 0.0 standard drinks     Comment: ocass     Family History   Problem Relation Age of Onset     Cancer Paternal Grandfather      Cerebrovascular Disease Maternal Grandmother      Glaucoma No family hx of      Macular Degeneration No family hx of          Current Outpatient Medications   Medication Sig Dispense Refill     hydrocortisone (CORTAID) 1 % external cream Apply topically 2 times daily 45 g 0     No Known Allergies  Recent Labs   Lab Test 12/22/15  0913   LDL 99   HDL 60   TRIG  "84        Reviewed and updated as needed this visit by clinical staff  Tobacco  Allergies  Meds  Problems  Med Hx  Surg Hx  Fam Hx  Soc Hx         Reviewed and updated as needed this visit by Provider  Tobacco  Allergies  Meds  Problems  Med Hx  Surg Hx  Fam Hx        History reviewed. No pertinent past medical history.   History reviewed. No pertinent surgical history.      Other than above,   Review of Systems   Constitutional: Negative for chills and fever.   HENT: Negative for congestion, ear pain, hearing loss and sore throat.    Eyes: Negative for pain and visual disturbance.   Respiratory: Negative for cough and shortness of breath.    Cardiovascular: Negative for chest pain, palpitations and peripheral edema.   Gastrointestinal: Negative for abdominal pain, constipation, diarrhea, heartburn, hematochezia and nausea.   Genitourinary: Negative for dysuria, frequency, genital sores, hematuria, impotence, penile discharge and urgency.   Musculoskeletal: Negative for arthralgias, joint swelling and myalgias.   Skin: Negative for rash.   Neurological: Negative for dizziness, weakness, headaches and paresthesias.   Psychiatric/Behavioral: Negative for mood changes. The patient is not nervous/anxious.        OBJECTIVE:   /72   Pulse 66   Temp 98.1  F (36.7  C) (Oral)   Ht 1.778 m (5' 10\")   Wt 108 kg (238 lb)   SpO2 97%   BMI 34.15 kg/m      Physical Exam  GENERAL: healthy, overweight, alert and no distress  EYES: Eyes grossly normal to inspection, PERRL and conjunctivae and sclerae normal  HENT: ear canals and TM's normal, nose and mouth without ulcers or lesions  NECK: no adenopathy, no asymmetry, masses, or scars and thyroid normal to palpation  RESP: lungs clear to auscultation - no rales, rhonchi or wheezes  CV: regular rate and rhythm, normal S1 S2, no S3 or S4, no murmur, click or rub, no peripheral edema and peripheral pulses strong  ABDOMEN: soft, nontender, no hepatosplenomegaly, " no masses and bowel sounds normal   (male): testicles normal without atrophy or masses, varicocele present left, no hernias, penis normal without urethral discharge and erythematous dry rash noted on scrotum bilaterally. No tenderness to palpation. No warmth.   MS: negative bilateral tinel's and ulnar tinels. Negative phalen's, negative bilateral Finkelstein, no gross musculoskeletal defects noted, no edema  SKIN: no suspicious lesions or rashes  NEURO: Normal strength and tone, mentation intact and speech normal  PSYCH: mentation appears normal, affect normal/bright  LYMPH: no cervical adenopathy    Diagnostic Test Results:  none     ASSESSMENT/PLAN:   (Z00.00) Routine general medical examination at a health care facility  (primary encounter diagnosis)  Comment: stable wellness. Discussed weight gain. Goal of ~30 lbs in next year of loss through diet and exercise.   Plan: Lipid panel reflex to direct LDL Fasting,         Glucose            (Z11.59) Need for hepatitis C screening test  Comment:   Plan: Hepatitis C Screen Reflex to HCV RNA Quant and         Genotype            (Z13.220) Screening for hyperlipidemia  Comment:   Plan:     (L30.9) Dermatitis  Comment: present on exam. Tinea considered, but does not appeear as this as well as no improvement with otc management. Dermatitis is likely. Will attempt low dose steroid cream for short course. If no improvement in 1 week or recurrent, will have see derm  Plan: hydrocortisone (CORTAID) 1 % external cream        -Medication use and side effects discussed with the patient. Patient is in complete understanding and agreement with plan.       (G56.03) Bilateral carpal tunnel syndrome  Comment: present on history. Normal exam. Sounds as mild symptoms. Nightly splinting recommended. If no improvement in 2 weeks, will have see ortho. If improved, will continue bracing for 6 weeks.   Plan: Wrist/Arm/Hand Supplies Order for DME - ONLY         FOR DME         "      COUNSELING:   Reviewed preventive health counseling, as reflected in patient instructions       Regular exercise       Healthy diet/nutrition       Immunizations    Vaccinated for: TDAP             Consider Hep C screening for all patients one time for ages 18-79 years    Estimated body mass index is 34.15 kg/m  as calculated from the following:    Height as of this encounter: 1.778 m (5' 10\").    Weight as of this encounter: 108 kg (238 lb).     Weight management plan: Discussed healthy diet and exercise guidelines    He reports that he has never smoked. He has never used smokeless tobacco.      Counseling Resources:  ATP IV Guidelines  Pooled Cohorts Equation Calculator  FRAX Risk Assessment  ICSI Preventive Guidelines  Dietary Guidelines for Americans, 2010  USDA's MyPlate  ASA Prophylaxis  Lung CA Screening    Tra Crocker PA-C  [facility]  "

## 2021-11-30 NOTE — PATIENT INSTRUCTIONS
Preventive Health Recommendations  Male Ages 26 - 39    Yearly exam:             See your health care provider every year in order to  o   Review health changes.   o   Discuss preventive care.    o   Review your medicines if your doctor has prescribed any.    You should be tested each year for STDs (sexually transmitted diseases), if you re at risk.     After age 35, talk to your provider about cholesterol testing. If you are at risk for heart disease, have your cholesterol tested at least every 5 years.     If you are at risk for diabetes, you should have a diabetes test (fasting glucose).  Shots: Get a flu shot each year. Get a tetanus shot every 10 years.     Nutrition:    Eat at least 5 servings of fruits and vegetables daily.     Eat whole-grain bread, whole-wheat pasta and brown rice instead of white grains and rice.     Get adequate Calcium and Vitamin D.     Lifestyle    Exercise for at least 150 minutes a week (30 minutes a day, 5 days a week). This will help you control your weight and prevent disease.     Limit alcohol to one drink per day.     No smoking.     Wear sunscreen to prevent skin cancer.     See your dentist every six months for an exam and cleaning.     Patient Education     Discharge Instructions for Carpal Tunnel Release  You had a carpal tunnel release procedure to help ease the symptoms of carpal tunnel syndrome. In carpal tunnel syndrome, a nerve in the wrist is compressed and irritated. This causes numbness and pain in the fingers and hand. Carpal tunnel release eases the compression of the nerve. Here are instructions that will help you care for your arm and wrist when you are at home.  Home care    Don't  objects tightly or lift with your affected arm.    Wear your bandage, splint, or cast as directed by your doctor.    Always keep the dressing, splint, or cast dry and clean.    When showering, cover your hand and wrist with plastic and use tape or rubber bands to keep the  dressing, splint, or cast dry. Shower as needed.      Use an ice pack, bag of frozen peas, or something similar wrapped in a thin towel on your wrist. Use it to reduce swelling for the first 48 hours. Leave the ice pack on for 20 minutes, then take it off for 20 minutes. Repeat as needed.    Keep your arm elevated above your heart for 24 to 48 hours after surgery.    Do the exercises you learned in the hospital, or as instructed by your doctor.    Take pain medicine as directed.    Don t drive until your doctor says it s OK. Never drive while you are taking opioid pain medicine.    Ask your doctor when you can return to work. If your job requires heavy lifting, you may not be able to begin working again for several weeks.  Follow-up care  Make a follow-up appointment as directed by your doctor.  Call 911  Call 911 right away if you have any of the following:    Chest pain    Shortness of breath  When to call your healthcare provider  Call your healthcare provider right away if you have any of the following:    A splint, cast, or dressing that is wet    Increased bleeding or drainage from the cut (incision)    Opening of the incision    Fever of 100.4 F (38 C) or higher, or as directed by your healthcare provider    Shaking chills    Any new numbness in the fingers or thumb    Blue hand or fingers    Pain gets worse with or without activity    Redness, tenderness, or swelling of the incision gets worse  Wanda last reviewed this educational content on 6/1/2018 2000-2021 [employer]. All rights reserved. This information is not intended as a substitute for professional medical care. Always follow your healthcare professional's instructions.

## 2021-12-01 LAB
CHOLEST SERPL-MCNC: 229 MG/DL
FASTING STATUS PATIENT QL REPORTED: YES
FASTING STATUS PATIENT QL REPORTED: YES
GLUCOSE BLD-MCNC: 100 MG/DL (ref 70–99)
HDLC SERPL-MCNC: 42 MG/DL
LDLC SERPL CALC-MCNC: 152 MG/DL
NONHDLC SERPL-MCNC: 187 MG/DL
TRIGL SERPL-MCNC: 177 MG/DL

## 2021-12-16 ENCOUNTER — ALLIED HEALTH/NURSE VISIT (OUTPATIENT)
Dept: FAMILY MEDICINE | Facility: CLINIC | Age: 36
End: 2021-12-16
Payer: COMMERCIAL

## 2021-12-16 DIAGNOSIS — Z23 NEED FOR TDAP VACCINATION: Primary | ICD-10-CM

## 2021-12-16 PROCEDURE — 90715 TDAP VACCINE 7 YRS/> IM: CPT

## 2021-12-16 PROCEDURE — 90471 IMMUNIZATION ADMIN: CPT

## 2021-12-16 PROCEDURE — 99207 PR NO CHARGE NURSE ONLY: CPT

## 2022-10-13 ENCOUNTER — ALLIED HEALTH/NURSE VISIT (OUTPATIENT)
Dept: FAMILY MEDICINE | Facility: CLINIC | Age: 37
End: 2022-10-13
Payer: COMMERCIAL

## 2022-10-13 DIAGNOSIS — Z23 NEED FOR PROPHYLACTIC VACCINATION AND INOCULATION AGAINST INFLUENZA: Primary | ICD-10-CM

## 2022-10-13 PROCEDURE — 99207 PR NO CHARGE NURSE ONLY: CPT

## 2022-10-13 PROCEDURE — 90471 IMMUNIZATION ADMIN: CPT

## 2022-10-13 PROCEDURE — 90686 IIV4 VACC NO PRSV 0.5 ML IM: CPT

## 2022-11-25 ENCOUNTER — ALLIED HEALTH/NURSE VISIT (OUTPATIENT)
Dept: FAMILY MEDICINE | Facility: CLINIC | Age: 37
End: 2022-11-25
Payer: COMMERCIAL

## 2022-11-25 DIAGNOSIS — Z23 HIGH PRIORITY FOR 2019-NCOV VACCINE: Primary | ICD-10-CM

## 2022-11-25 PROCEDURE — 91312 COVID-19 VACCINE BIVALENT BOOSTER 12+ (PFIZER): CPT

## 2022-11-25 PROCEDURE — 99207 PR NO CHARGE NURSE ONLY: CPT

## 2022-11-25 PROCEDURE — 0124A COVID-19 VACCINE BIVALENT BOOSTER 12+ (PFIZER): CPT

## 2023-04-23 ENCOUNTER — HEALTH MAINTENANCE LETTER (OUTPATIENT)
Age: 38
End: 2023-04-23

## 2023-10-07 ENCOUNTER — IMMUNIZATION (OUTPATIENT)
Dept: FAMILY MEDICINE | Facility: CLINIC | Age: 38
End: 2023-10-07
Payer: COMMERCIAL

## 2023-10-07 DIAGNOSIS — Z23 NEED FOR COVID-19 VACCINE: Primary | ICD-10-CM

## 2023-10-07 PROCEDURE — 99207 PR NO CHARGE NURSE ONLY: CPT

## 2023-10-07 PROCEDURE — 90480 ADMN SARSCOV2 VAC 1/ONLY CMP: CPT

## 2023-10-07 PROCEDURE — 91320 SARSCV2 VAC 30MCG TRS-SUC IM: CPT

## 2023-10-23 ENCOUNTER — IMMUNIZATION (OUTPATIENT)
Dept: FAMILY MEDICINE | Facility: CLINIC | Age: 38
End: 2023-10-23
Payer: COMMERCIAL

## 2023-10-23 DIAGNOSIS — Z23 NEED FOR PROPHYLACTIC VACCINATION AND INOCULATION AGAINST INFLUENZA: Primary | ICD-10-CM

## 2023-10-23 PROCEDURE — 99207 PR NO CHARGE NURSE ONLY: CPT

## 2023-10-23 PROCEDURE — 90471 IMMUNIZATION ADMIN: CPT

## 2023-10-23 PROCEDURE — 90686 IIV4 VACC NO PRSV 0.5 ML IM: CPT

## 2024-06-30 ENCOUNTER — HEALTH MAINTENANCE LETTER (OUTPATIENT)
Age: 39
End: 2024-06-30

## 2024-11-06 ENCOUNTER — ALLIED HEALTH/NURSE VISIT (OUTPATIENT)
Dept: FAMILY MEDICINE | Facility: CLINIC | Age: 39
End: 2024-11-06
Payer: COMMERCIAL

## 2024-11-06 VITALS — TEMPERATURE: 98.5 F

## 2024-11-06 DIAGNOSIS — Z23 ENCOUNTER FOR IMMUNIZATION: Primary | ICD-10-CM

## 2024-11-06 PROCEDURE — 90656 IIV3 VACC NO PRSV 0.5 ML IM: CPT

## 2024-11-06 PROCEDURE — 99207 PR NO CHARGE NURSE ONLY: CPT

## 2024-11-06 PROCEDURE — 90480 ADMN SARSCOV2 VAC 1/ONLY CMP: CPT

## 2024-11-06 PROCEDURE — 91320 SARSCV2 VAC 30MCG TRS-SUC IM: CPT

## 2024-11-06 PROCEDURE — 90471 IMMUNIZATION ADMIN: CPT

## 2024-11-06 NOTE — PROGRESS NOTES
Prior to immunization administration, verified patients identity using patient s name and date of birth. Please see Immunization Activity for additional information.     Is the patient's temperature normal (100.5 or less)? Yes     Patient MEETS CRITERIA. PROCEED with vaccine administration.          11/6/2024   COVID   Have you had myocarditis or pericarditis (inflammation of or around the heart muscle) after getting a COVID-19 vaccine? No   Have you had a serious reaction to a COVID vaccine or something in a COVID vaccine, like polyethylene glycol (PEG) or polysorbate? No   Have you had multisystem inflammatory syndrome from COVID-19 in the past 90 days? No   Have you received a bone marrow transplant within the previous 3 months? No            Patient MEETS CRITERIA. PROCEED with vaccine administration.            11/6/2024   INFLUENZA   Would you like to receive the flu shot or the nasal flu vaccine today? Flu Shot   Have you had a serious reaction to a flu vaccine or something in a flu vaccine? No   Have you had Guillain-[place] syndrome within 6 weeks of getting a vaccine? No   Have you received a bone marrow transplant within the previous 6 months? No            Patient MEETS CRITERIA. PROCEED with vaccine administration.        Patient instructed to remain in clinic for 15 minutes afterwards, and to report any adverse reactions.      Link to Ancillary Visit Immunization Standing Orders SmartSet     Screening performed by Lizet Rdz MA on 11/6/2024 at 7:31 AM.

## 2025-07-13 ENCOUNTER — HEALTH MAINTENANCE LETTER (OUTPATIENT)
Age: 40
End: 2025-07-13